# Patient Record
Sex: MALE | Race: WHITE | NOT HISPANIC OR LATINO | Employment: UNEMPLOYED | URBAN - METROPOLITAN AREA
[De-identification: names, ages, dates, MRNs, and addresses within clinical notes are randomized per-mention and may not be internally consistent; named-entity substitution may affect disease eponyms.]

---

## 2024-03-24 ENCOUNTER — APPOINTMENT (OUTPATIENT)
Dept: GENERAL RADIOLOGY | Facility: HOSPITAL | Age: 64
End: 2024-03-24
Payer: MEDICARE

## 2024-03-24 ENCOUNTER — HOSPITAL ENCOUNTER (EMERGENCY)
Facility: HOSPITAL | Age: 64
Discharge: HOME | End: 2024-03-24
Attending: EMERGENCY MEDICINE | Admitting: EMERGENCY MEDICINE
Payer: MEDICARE

## 2024-03-24 VITALS
TEMPERATURE: 98.2 F | SYSTOLIC BLOOD PRESSURE: 116 MMHG | HEIGHT: 70 IN | WEIGHT: 210 LBS | BODY MASS INDEX: 30.06 KG/M2 | DIASTOLIC BLOOD PRESSURE: 77 MMHG | RESPIRATION RATE: 21 BRPM | HEART RATE: 85 BPM | OXYGEN SATURATION: 95 %

## 2024-03-24 DIAGNOSIS — S73.004A DISLOCATION OF RIGHT HIP, INITIAL ENCOUNTER: Primary | ICD-10-CM

## 2024-03-24 PROCEDURE — 25010000002 PROPOFOL 10 MG/ML EMULSION: Performed by: EMERGENCY MEDICINE

## 2024-03-24 PROCEDURE — 73502 X-RAY EXAM HIP UNI 2-3 VIEWS: CPT

## 2024-03-24 PROCEDURE — 99285 EMERGENCY DEPT VISIT HI MDM: CPT

## 2024-03-24 RX ORDER — PROPOFOL 10 MG/ML
50 VIAL (ML) INTRAVENOUS ONCE
Status: COMPLETED | OUTPATIENT
Start: 2024-03-24 | End: 2024-03-24

## 2024-03-24 RX ORDER — SODIUM CHLORIDE 0.9 % (FLUSH) 0.9 %
10 SYRINGE (ML) INJECTION AS NEEDED
Status: DISCONTINUED | OUTPATIENT
Start: 2024-03-24 | End: 2024-03-24 | Stop reason: HOSPADM

## 2024-03-24 RX ORDER — KETAMINE HCL IN NACL, ISO-OSM 100MG/10ML
50 SYRINGE (ML) INJECTION ONCE
Status: COMPLETED | OUTPATIENT
Start: 2024-03-24 | End: 2024-03-24

## 2024-03-24 RX ORDER — PROPOFOL 10 MG/ML
VIAL (ML) INTRAVENOUS
Status: COMPLETED | OUTPATIENT
Start: 2024-03-24 | End: 2024-03-24

## 2024-03-24 RX ADMIN — PROPOFOL 50 MG: 10 INJECTION, EMULSION INTRAVENOUS at 17:43

## 2024-03-24 RX ADMIN — Medication 50 MG: at 17:41

## 2024-03-24 RX ADMIN — PROPOFOL 50 MG: 10 INJECTION, EMULSION INTRAVENOUS at 17:39

## 2024-03-24 NOTE — ED PROVIDER NOTES
Subjective   History of Present Illness  63-year-old male presents with complaints of hip popped when he was cutting firewood and twisted.  He states he had hip replacement about 22 years ago.  He states it is dislocated previously but has been several years.  He did not have fall or direct trauma.  He states it took him a couple virus to be able to crawl out of the woods.  Review of Systems    No past medical history on file.  Bilateral hip replacement, lumbar spondylosis, hyperlipidemia.  Hypertension, COPD  Allergies   Allergen Reactions    Lisinopril Anaphylaxis, Other (See Comments) and Swelling    Penicillins Unknown (See Comments)       No past surgical history on file.    No family history on file.    Social History     Socioeconomic History    Marital status: Single     Current medications Norvasc atorvastatin diclofenac Trelegy.      Objective   Physical Exam  63-year-old male awake alert.  Generally overweight.  Chest clear cardiovascular rhythm.  Examination of right leg complains of pain to right hip.  He has minimal shortening.  He complains of some slight decrease sensation to right leg compared to left.  He is otherwise neurovascular intact.  FX Dislocation    Date/Time: 3/24/2024 5:51 PM    Performed by: Patricio Montes MD  Authorized by: Patricio Montes MD    Consent:     Consent obtained:  Verbal    Consent given by:  Patient  Universal protocol:     Immediately prior to procedure, a time out was called: yes      Patient identity confirmed:  Verbally with patient  Injury:     Injury location:  Hip    Hip injury location:  R hip  Pre-procedure details:     Distal neurologic exam:  Numbness  Sedation:     Sedation type:  Moderate sedation  Procedure details:     Manipulation performed: yes      X-ray confirmed reduction: yes    Post-procedure details:     Distal neurologic exam:  Unchanged    Procedure completion:  Tolerated well, no immediate complications  Comments:      Patient had moderate  "sedation performed using 100 mg of propofol and 50 mg of ketamine.  Following this using Captain Ruben technique hip was successfully relocated.  Patient has sedation time of 10 minutes.  Postprocedure he reports still some slight decrease sensation right leg compared to left but has normal motor exam with normal pulses             ED Course      No results found for this or any previous visit.  XR Hip With or Without Pelvis 2 - 3 View Right    Result Date: 3/24/2024  Impression: Right hip arthroplasty now appears well aligned. No definite acute fracture is identified. Electronically Signed: Tomas Gee MD  3/24/2024 6:21 PM EDT  Workstation ID: [id number]    XR Hip With or Without Pelvis 2 - 3 View Right    Result Date: 3/24/2024  Impression: Dislocated right hip arthroplasty. No evidence of acute fractures Electronically Signed: Marco Antonio Suarez MD  3/24/2024 5:05 PM EDT  Workstation ID: [id number]   Medications   sodium chloride 0.9 % flush 10 mL (has no administration in time range)   HYDROmorphone (DILAUDID) injection 1 mg (1 mg Intravenous Not Given 3/24/24 1735)   ketamine hcl syringe solution prefilled syringe 50 mg (50 mg Intravenous Given 3/24/24 1741)   Propofol (DIPRIVAN) injection 50 mg (50 mg Intravenous Given 3/24/24 1739)   Propofol (DIPRIVAN) injection (50 mg Intravenous Given 3/24/24 1743)     /100   Pulse 92   Temp 98.2 °F (36.8 °C) (Oral)   Resp 21   Ht 177.8 cm (70\")   Wt 95.3 kg (210 lb)   SpO2 90%   BMI 30.13 kg/m²                                          Medical Decision Making  Problems Addressed:  Dislocation of right hip, initial encounter: complicated acute illness or injury    Amount and/or Complexity of Data Reviewed  Radiology: ordered.    Risk  Prescription drug management.    Differential fracture, dislocation, strain  My xray review and interpretation  Right hip and pelvis #1 shows bilateral hip arthroplasty with dislocation on the right.  No fracture.  Right hip #2 show " satisfactory reduction of hip arthroplasty without fracture  Patient's findings were discussed with him.  He was discharged.  He was given orthopedic follow-up.  He can use crutches as needed to assist with ambulation weight-bear as tolerated.    Final diagnoses:   Dislocation of right hip, initial encounter       ED Disposition  ED Disposition       ED Disposition   Discharge    Condition   Stable    Comment   --               Red Gardner II, MD  [address] [facility] [place] [address]  [phone]               Medication List      No changes were made to your prescriptions during this visit.            Patricio Montes MD  03/24/24 7830